# Patient Record
Sex: MALE | Race: WHITE | NOT HISPANIC OR LATINO | URBAN - METROPOLITAN AREA
[De-identification: names, ages, dates, MRNs, and addresses within clinical notes are randomized per-mention and may not be internally consistent; named-entity substitution may affect disease eponyms.]

---

## 2018-01-29 ENCOUNTER — OFFICE VISIT - RIVER FALLS (OUTPATIENT)
Dept: FAMILY MEDICINE | Facility: CLINIC | Age: 53
End: 2018-01-29

## 2018-10-20 ENCOUNTER — OFFICE VISIT - RIVER FALLS (OUTPATIENT)
Dept: FAMILY MEDICINE | Facility: CLINIC | Age: 53
End: 2018-10-20

## 2021-05-31 ENCOUNTER — RECORDS - HEALTHEAST (OUTPATIENT)
Dept: ADMINISTRATIVE | Facility: CLINIC | Age: 56
End: 2021-05-31

## 2022-02-11 VITALS
TEMPERATURE: 97.6 F | DIASTOLIC BLOOD PRESSURE: 82 MMHG | SYSTOLIC BLOOD PRESSURE: 141 MMHG | WEIGHT: 214.4 LBS | HEART RATE: 72 BPM

## 2022-02-16 NOTE — PROGRESS NOTES
Patient:   KWASI BANERJEE            MRN: [id number]            FIN: [id number]               Age:   52 years     Sex:  Male     :  1965   Associated Diagnoses:   Crushing injury of right hand   Author:   Kt Bonilla PA-C      Report Summary   Diagnosis  Crushing injury of right hand ([id number] S67.21XA).  Patient Instructions   Visit Information      Date of Service: 10/20/2018 11:26 am  Performing Location: [facility]  Encounter#: [id number]      Primary Care Provider (PCP):  NABIL ALONSO   Visit type:  General concerns.    Accompanied by:  No one.    Source of history:  Self.    Referral source:  Self.    History limitation:  None.       Chief Complaint   10/20/2018 11:29 AM CDT  Crushing injury to right hand on Thursday night.      History of Present Illness             The patient presents with hand pain.  The location of pain is the right.  The pain is described as aching and throbbing.  The severity of the pain is moderate.  The timing/course of the pain is episodic, fluctuates in intensity and is improving.  Crushed between two limbs while cutting up wood on Thursday. Pain and swelling are improved, but wife and daughter told him he should have it checked. CC above noted and confirmed with the patient.Abrasion noted over dorsum of fourth metacarpal. .        Review of Systems   Constitutional:  Negative.    Musculoskeletal:  Negative except as documented in history of present illness.    Integumentary:  Negative except as documented in history of present illness.    Neurologic:  Numbness, Tingling.       Health Status   Allergies:    Allergic Reactions (All)  Severity Not Documented  No known allergies (No reactions were documented)   Medications:  (Selected)   Prescriptions  Prescribed  Augmentin 875 mg oral tablet: 1 tab(s), PO, q12hr, # 14 tab(s), 0 Refill(s), Type: Maintenance, Pharmacy: [facility] [id number], 1 tab(s) po q12 hrs,x7 day(s)  Documented  Medications  Documented  FENOFIBRATE 160 MG ORAL TABLET ([id number]): ( 160 mg ), po, # 30 tab(s), 11 Refill(s)  LEVOTHYROXINE 300 MCG (0.3 MG) ORAL TABLET ([id number]): 1 tab(s), po, # 30 tab(s), 11 Refill(s)  PAXIL 20 MG ORAL TABLET ([id number]): 1 tab(s), po, # 30 tab(s), 11 Refill(s)  glipiZIDE 10 mg oral tablet: 1 tab(s) ( 10 mg ), po, daily, 0 Refill(s), Type: Maintenance  lisinopril 40 mg oral tablet: 1 tab(s) ( 40 mg ), po, daily, 0 Refill(s), Type: Maintenance  metFORMIN 1000 mg oral tablet: 1 tab(s) ( 1,000 mg ), po, bid, 0 Refill(s), Type: Maintenance  pioglitazone 30 mg oral tablet: 1 tab(s) ( 30 mg ), po, daily, 0 Refill(s), Type: Maintenance  simvastatin 40 mg oral tablet: 1 tab(s) ( 40 mg ), po, hs, 0 Refill(s), Type: Maintenance  warfarin 5 mg oral tablet: 1 tab(s) ( 5 mg ), po, daily, 0 Refill(s), Type: Maintenance   Problem list:    All Problems (Selected)  Lupus anticoagulant disorder / SNOMED CT 1292644939 / Confirmed  History of DVT in adulthood / SNOMED CT 8813918266 / Confirmed  Diabetes Mellitus without Mention of Complication, Type II or Unspecified Type, Not Stated as Uncontrolled / ICD-9-.00 / Confirmed  Unspecified Essential Hypertension / ICD-9-.9 / Confirmed  Other and Unspecified Hyperlipidemia / ICD-9-.4 / Confirmed  Unspecified Hypothyroidism / ICD-9-.9 / Confirmed  Personal History of Other Mental Disorders / ICD-9-CM V11.8 / Confirmed  Primary Hypercoagulable State / ICD-9-.81 / Confirmed  Obstructive Sleep Apnea (Adult) (Pediatric) / ICD-9-.23 / Confirmed  Obesity, Unspecified / ICD-9-.00 / Probable      Histories   Past Medical History:    Active  History of DVT in adulthood (1535526832)  Lupus anticoagulant disorder (2486096688)  Resolved  Acute Venous Embolism and Thrombosis of Unspecified Deep Vessels of Lower Extremity (453.40):  Resolved.  Comments:      -   History of  Calculus of Kidney (592.0):  Resolved.  Comments:      -   History of  multiple   Family History:    No family history items have been selected or recorded.   Procedure history:    No active procedure history items have been selected or recorded.      Physical Examination   Vital Signs   10/20/2018 11:29 AM CDT Temperature Tympanic 97.6 DegF  LOW    Peripheral Pulse Rate 72 bpm    HR Method Electronic    Systolic Blood Pressure 141 mmHg  HI    Diastolic Blood Pressure 82 mmHg  HI    Mean Arterial Pressure 102 mmHg    BP Method Electronic      Measurements from flowsheet : Measurements   10/20/2018 11:29 AM CDT  Weight Measured - Standard                214.4 lb     Cardiovascular:          Arterial pulses: Right, Radial, 2+.         Capillary refill: Right, Upper extremity, Less than 2 seconds.    Musculoskeletal:  Normal strength, No deformity, Swelling over the dorsum of the right medial hand. Most tenderness centered over fourth and fifth metacarpals. .    Integumentary:  Abrasion as noted above. No erythema. Ecchymosis noted over dorsum of the medial hand.       Review / Management   Radiology results   Reviewed radiologist's report      Impression and Plan   Diagnosis     Crushing injury of right hand ([id number] S67.21XA).     Patient Instructions:       Counseled: Patient, Regarding diagnosis, Regarding medications, Activity, Verbalized understanding.    Ice, Advil, or Aleve, Tylenol PRN.  Elevate. Activity as tolerated. Recheck in one week if symptoms particularly the numbness persists. FU prior if increased pain, any sign of infection, more numbness, etc.